# Patient Record
Sex: FEMALE | Race: WHITE
[De-identification: names, ages, dates, MRNs, and addresses within clinical notes are randomized per-mention and may not be internally consistent; named-entity substitution may affect disease eponyms.]

---

## 2017-08-17 NOTE — EDM.PDOC
ED HPI GENERAL MEDICAL PROBLEM





- General


Chief Complaint: Chest Pain


Stated Complaint: CHEST PRESSURE


Time Seen by Provider: 08/17/17 11:53


Source of Information: Reports: Patient, RN Notes Reviewed


History Limitations: Reports: No Limitations





- History of Present Illness


INITIAL COMMENTS - FREE TEXT/NARRATIVE: 





52-year-old female presents emergency department day complaint of chest 

pressure she states been having some palpitations and heartburn chest pressure 

with radiations into her left arm for the last 2 days she does not feel short 

of breath but it hurts when she takes a deep breath no nausea no diaphoresis 

she was able to go through her workout routine this morning without difficulty 

felt she had no change in her chest pressure did try Tums felt she had little 

relief from has noticed no change with food. Initially tried to go to the 

clinic however with the complaint of chest pain she was immediately referred to 

the emergency department for further evaluation


  ** Epigastric


Pain Score (Numeric/FACES): 5





- Related Data


 Allergies











Allergy/AdvReac Type Severity Reaction Status Date / Time


 


metronidazole [From Flagyl] Allergy Severe Airway Verified 08/17/17 11:39





   Tightness  











Home Meds: 


 Home Meds





Ergocalciferol (Vitamin D2) [Vitamin D2] 50,000 units PO WEEKLY 08/17/17 [

History]


Estradiol [Estradiol] 1 patch TOP WEEKLY 08/17/17 [History]


buPROPion HCl [Wellbutrin Xl] 150 mg PO DAILY 08/17/17 [History]











Past Medical History


HEENT History: Reports: Cataract, Impaired Vision


OB/GYN History: Reports: Pregnancy





- Past Surgical History


Female  Surgical History: Reports: Nephrectomy





Social & Family History





- Tobacco Use


Smoking Status *Q: Never Smoker





- Caffeine Use


Caffeine Use: Reports: Coffee





- Alcohol Use


Days Per Week of Alcohol Use: 1


Number of Drinks Per Day: 1


Total Drinks Per Week: 1





- Recreational Drug Use


Recreational Drug Use: No





ED ROS GENERAL





- Review of Systems


Review Of Systems: See Below


Constitutional: Denies: Diaphoresis


HEENT: Reports: No Symptoms


Respiratory: Reports: Shortness of Breath (Only with a deep breath)


Cardiovascular: Reports: Chest Pain


GI/Abdominal: Reports: No Symptoms


: Reports: No Symptoms





ED EXAM, GENERAL





- Physical Exam


Exam: See Below


Free Text/Narrative:: 





General: Female, anxious but not in any distress, alert and oriented x3 HEENT: 

head is atraumatic normocephalic, eyes pupils equal round reactive to light, 

sclera clear no conjunctivitis appreciated.  Ears blocked by cerumen 

bilaterally canals are clear.  Nose no septal deviation, nares are clear, no 

blood present.  Mouth mucosa is moist and pink no erythema or exudate noted in 

soft palate, tongue is midline uvula is midline, dentition is intact.


Neck: Supple no thyromegaly no tracheal deviation.


Nodes: Cervical nodes subclavicular nodes nontender no palpable lymphadenopathy 

noted.


Lungs: clear to auscultation bilaterally with symmetrical respirations, no 

adventitious noise appreciated.


CV: Regular rate and rhythm S1 and S2 appreciated no murmurs rubs or gallops 

noted.


Abdomen: Soft, nontender, no palpable masses or organomegaly appreciated, no 

distention no guarding bowel sounds are present, .


Neuro: Cranial nerves II through XII grossly intact


Skin: Warm and dry, intact


Extremities: No lower extremity edema appreciated, 





Course





- Vital Signs


Last Recorded V/S: 


 Last Vital Signs











Temp  98.4 F   08/17/17 11:35


 


Pulse  64   08/17/17 11:35


 


Resp  24 H  08/17/17 11:57


 


BP  133/85   08/17/17 11:57


 


Pulse Ox  93 L  08/17/17 11:57














- Orders/Labs/Meds


Orders: 


 Active Orders 24 hr











 Category Date Time Status


 


 Cardiac Monitoring [RC] .As Directed Care  08/17/17 12:00 Active


 


 EKG Documentation Completion [RC] ASDIRECTED Care  08/17/17 12:00 Active


 


 Chest 2V [CR] Stat Exams  08/17/17 12:00 Taken


 


 EKG 12 Lead [EK] Stat Ther  08/17/17 12:00 Ordered











Labs: 


 Laboratory Tests











  08/17/17 08/17/17 08/17/17 Range/Units





  12:06 12:06 12:06 


 


WBC  7.7    (4.5-11.0)  K/uL


 


RBC  4.58    (3.30-5.50)  M/uL


 


Hgb  14.3    (12.0-15.0)  g/dL


 


Hct  41.1    (36.0-48.0)  %


 


MCV  90    (80-98)  fL


 


MCH  31    (27-31)  pg


 


MCHC  35    (32-36)  %


 


Plt Count  363    (150-400)  K/uL


 


Neut % (Auto)  66    (36-66)  %


 


Lymph % (Auto)  22 L    (24-44)  %


 


Mono % (Auto)  9 H    (2-6)  %


 


Eos % (Auto)  2    (2-4)  %


 


Baso % (Auto)  1    (0-1)  %


 


D-Dimer, Quantitative   < 100   (0.0-400.0)  ng/mL


 


Sodium    142  (140-148)  mmol/L


 


Potassium    3.8  (3.6-5.2)  mmol/L


 


Chloride    105  (100-108)  mmol/L


 


Carbon Dioxide    30  (21-32)  mmol/L


 


Anion Gap    6.9  (5.0-14.0)  mmol/L


 


BUN    20 H  (7-18)  mg/dL


 


Creatinine    1.1 H  (0.6-1.0)  mg/dL


 


Est Cr Clr Drug Dosing    50.49  mL/min


 


Estimated GFR (MDRD)    52 L  (>60)  


 


Glucose    97  ()  mg/dL


 


Calcium    9.3  (8.5-10.1)  mg/dL


 


Total Bilirubin    0.4  (0.2-1.0)  mg/dL


 


AST    18  (15-37)  U/L


 


ALT    24  (12-78)  U/L


 


Alkaline Phosphatase    36 L  ()  U/L


 


Troponin I    < 0.017  (0.000-0.056)  ng/mL


 


Total Protein    7.0  (6.4-8.2)  g/dL


 


Albumin    3.4  (3.4-5.0)  g/dL


 


Globulin    3.6 H  (2.3-3.5)  g/dL


 


Albumin/Globulin Ratio    0.9 L  (1.2-2.2)  











Meds: 


Medications














Discontinued Medications














Generic Name Dose Route Start Last Admin





  Trade Name Freq  PRN Reason Stop Dose Admin


 


Al Hydroxide/Mg Hydroxide 15  0 ml  08/17/17 12:00  08/17/17 12:08





  ml/ Lidocaine HCl 15 ml  PO  08/17/17 12:01  15 ml





  ONETIME ONE   Administration














Departure





- Departure


Time of Disposition: 12:58


Disposition: Home, Self-Care 01


Condition: Good


Clinical Impression: 


 Atypical chest pain





Forms:  ED Department Discharge


Additional Instructions: 


Try over-the-counter antacid such as Zantac or Nexium,  Please followup with 

your primary care provider in 3-5 days if not better, please call return to the 

emergency department with worsening of symptoms.





- My Orders


Last 24 Hours: 


My Active Orders





08/17/17 12:00


Cardiac Monitoring [RC] .As Directed 


EKG Documentation Completion [RC] ASDIRECTED 


Chest 2V [CR] Stat 


EKG 12 Lead [EK] Stat 














- Assessment/Plan


Last 24 Hours: 


My Active Orders





08/17/17 12:00


Cardiac Monitoring [RC] .As Directed 


EKG Documentation Completion [RC] ASDIRECTED 


Chest 2V [CR] Stat 


EKG 12 Lead [EK] Stat 











Plan: 





Assessment





Acuity = acute





Site and laterality = chest pressure, located patient known history of anxiety





Etiology  = suspicious for underlying reflux disease





Manifestations = none





Location of injury =  Home





Lab values = CBC unremarkable, creatinine elevated 1.1 consistent with a 

chronic renal failure stage GIII a, troponin was negative, EKG demonstrates 

normal sinus rhythm no ST changes, chest x-ray I did review films myself I 

cannot appreciate any acute process, the official read from radiology is pending

, heart score is 1





Plan


I did review lab work EKG and chest x-ray results with her she was provided GI 

cocktail which provided symptomatic relief recommend she try over-the-counter 

antacid suppresses such as Nexium or Zantac, follow-up primary care in 3-5 days 

for reevaluation





























Patient was in agreement with the plan all questions were answered, they were 

instructed to return to the emergency department or call for worsening 

symptoms.  This note was dictated using dragon voice recognition software 

please call with any questions.

## 2017-08-17 NOTE — CR
Chest 2V

 

INDICATION:   Chest Pain 

 

COMPARISON:    None

 

FINDINGS:  Two views.   Heart size normal. Lungs are clear. No infiltrate or pleural effusion. No si
gns of pulmonary edema. Fat at the cardiophrenic angle on the right unchanged, normal variant.

 

IMPRESSION:    Negative chest.

## 2019-01-21 ENCOUNTER — HOSPITAL ENCOUNTER (EMERGENCY)
Dept: HOSPITAL 11 - JP.ED | Age: 54
Discharge: HOME | End: 2019-01-21
Payer: COMMERCIAL

## 2019-01-21 VITALS — DIASTOLIC BLOOD PRESSURE: 82 MMHG | SYSTOLIC BLOOD PRESSURE: 136 MMHG

## 2019-01-21 DIAGNOSIS — Z88.8: ICD-10-CM

## 2019-01-21 DIAGNOSIS — K59.01: Primary | ICD-10-CM

## 2019-01-21 DIAGNOSIS — Z79.899: ICD-10-CM

## 2019-01-21 PROCEDURE — 99283 EMERGENCY DEPT VISIT LOW MDM: CPT

## 2019-01-21 PROCEDURE — 85025 COMPLETE CBC W/AUTO DIFF WBC: CPT

## 2019-01-21 PROCEDURE — 96372 THER/PROPH/DIAG INJ SC/IM: CPT

## 2019-01-21 PROCEDURE — 80053 COMPREHEN METABOLIC PANEL: CPT

## 2019-01-21 PROCEDURE — 74019 RADEX ABDOMEN 2 VIEWS: CPT

## 2019-01-21 PROCEDURE — 81001 URINALYSIS AUTO W/SCOPE: CPT

## 2019-01-21 PROCEDURE — 36415 COLL VENOUS BLD VENIPUNCTURE: CPT

## 2019-01-21 NOTE — EDM.PDOC
ED HPI GENERAL MEDICAL PROBLEM





- General


Chief Complaint: Back Pain or Injury


Stated Complaint: BACK PAIN


Time Seen by Provider: 01/21/19 07:38


Source of Information: Reports: Patient


History Limitations: Reports: No Limitations





- History of Present Illness


INITIAL COMMENTS - FREE TEXT/NARRATIVE: 





This lady complains of left flank and left upper quadrant pain. It's been going 

on for about 3 or 4 days now. She was seen in the clinic on Friday and told 

that it was probably shingles but she is certain that it's not and she's never 

developed any kind of rash. The left upper quadrant pain does seem to be 

associated with movement. She has regular bowel movements. At one time she did 

think that maybe there was some blockage so she drank about half a bottle of 

magnesium citrate. That was yesterday. Cause some cramps and she had just a 

little bit of a loose bowel movement this morning. Denies any urinary symptoms. 

Denies any fever.


  ** Left Flank


Pain Score (Numeric/FACES): 3





- Related Data


 Allergies











Allergy/AdvReac Type Severity Reaction Status Date / Time


 


metronidazole [From Flagyl] Allergy Severe Airway Verified 08/17/17 11:39





   Tightness  











Home Meds: 


 Home Meds





Ergocalciferol (Vitamin D2) [Vitamin D2] 50,000 units PO WEEKLY 08/17/17 [

History]


Estradiol 1 patch TOP WEEKLY 08/17/17 [History]


buPROPion HCl [Wellbutrin Xl] 150 mg PO DAILY 08/17/17 [History]











Past Medical History





- Past Health History


Medical/Surgical History: Denies Medical/Surgical History


HEENT History: Reports: Cataract, Impaired Vision


OB/GYN History: Reports: Pregnancy





- Infectious Disease History


Infectious Disease History: Reports: Chicken Pox





- Past Surgical History


Female  Surgical History: Reports: Nephrectomy





Social & Family History





- Tobacco Use


Smoking Status *Q: Never Smoker





- Caffeine Use


Caffeine Use: Reports: Coffee





- Recreational Drug Use


Recreational Drug Use: No





ED ROS GENERAL





- Review of Systems


Review Of Systems: See Below


Constitutional: Reports: No Symptoms


HEENT: Reports: No Symptoms


Respiratory: Reports: No Symptoms


Cardiovascular: Reports: No Symptoms


Endocrine: Reports: No Symptoms


GI/Abdominal: Reports: Abdominal Pain.  Denies: Constipation, Diarrhea


: Reports: No Symptoms


Musculoskeletal: Reports: No Symptoms


Skin: Reports: No Symptoms


Neurological: Reports: No Symptoms





ED EXAM,LOWER BACK PAIN/INJURY





- Physical Exam


Exam: See Below


Exam Limited By: No Limitations


General Appearance: Alert, WD/WN, Mild Distress


Throat/Mouth: Normal Inspection


Respiratory/Chest: No Respiratory Distress, Lungs Clear


Cardiovascular: Regular Rate, Rhythm, No Murmur


GI/Abdominal: Normal Bowel Sounds, Soft, Tender (Left upper quadrant tenderness)


Back Exam: CVA Tenderness (L) (Mild left CVA tenderness)


Extremities: Normal Inspection


Neurological: Alert, Normal Mood/Affect


Skin Exam: Warm, Dry





Course





- Vital Signs


Last Recorded V/S: 





 Last Vital Signs











Temp  35.5 C   01/21/19 07:22


 


Pulse  75   01/21/19 07:22


 


Resp  14   01/21/19 07:22


 


BP  136/82   01/21/19 07:22


 


Pulse Ox  97   01/21/19 07:22














- Orders/Labs/Meds


Orders: 





 Active Orders 24 hr











 Category Date Time Status


 


 Abdomen 2V AP Flat Upright [CR] Stat Exams  01/21/19 07:49 Taken











Labs: 





 Laboratory Tests











  01/21/19 01/21/19 01/21/19 Range/Units





  08:01 08:09 08:09 


 


WBC   5.7   (4.5-11.0)  K/uL


 


RBC   4.58   (3.30-5.50)  M/uL


 


Hgb   13.9   (12.0-15.0)  g/dL


 


Hct   41.1   (36.0-48.0)  %


 


MCV   90   (80-98)  fL


 


MCH   30   (27-31)  pg


 


MCHC   34   (32-36)  %


 


Plt Count   384   (150-400)  K/uL


 


Neut % (Auto)   58   (36-66)  %


 


Lymph % (Auto)   27   (24-44)  %


 


Mono % (Auto)   10 H   (2-6)  %


 


Eos % (Auto)   4   (2-4)  %


 


Baso % (Auto)   1   (0-1)  %


 


Sodium    140  (140-148)  mmol/L


 


Potassium    4.2  (3.6-5.2)  mmol/L


 


Chloride    105  (100-108)  mmol/L


 


Carbon Dioxide    29  (21-32)  mmol/L


 


Anion Gap    5.8  (5.0-14.0)  mmol/L


 


BUN    19 H  (7-18)  mg/dL


 


Creatinine    0.9  (0.6-1.0)  mg/dL


 


Est Cr Clr Drug Dosing    60.99  mL/min


 


Estimated GFR (MDRD)    > 60  (>60)  


 


Glucose    107 H  ()  mg/dL


 


Calcium    9.4  (8.5-10.1)  mg/dL


 


Total Bilirubin    0.4  (0.2-1.0)  mg/dL


 


AST    17  (15-37)  U/L


 


ALT    28  (12-78)  U/L


 


Alkaline Phosphatase    45 L  ()  U/L


 


Total Protein    6.9  (6.4-8.2)  g/dL


 


Albumin    3.3 L  (3.4-5.0)  g/dL


 


Globulin    3.6 H  (2.3-3.5)  g/dL


 


Albumin/Globulin Ratio    0.9 L  (1.2-2.2)  


 


Urine Color  Yellow    


 


Urine Appearance  Clear    


 


Urine pH  6.0    (4.5-8.0)  


 


Ur Specific Gravity  1.015    (1.008-1.030)  


 


Urine Protein  Trace    (NEGATIVE)  mg/dL


 


Urine Glucose (UA)  Normal    (NEGATIVE)  mg/dL


 


Urine Ketones  Negative    (NEGATIVE)  mg/dL


 


Urine Occult Blood  Trace    (NEGATIVE)  


 


Urine Nitrite  Negative    (NEGATIVE)  


 


Urine Bilirubin  Negative    (NEGATIVE)  


 


Urine Urobilinogen  Normal    (NORMAL)  mg/dL


 


Ur Leukocyte Esterase  Small    (NEGATIVE)  


 


Urine RBC  5-10 H    (0-5)  


 


Urine WBC  5-10 H    (0-5)  


 


Ur Epithelial Cells  Few    


 


Amorphous Sediment  Few    


 


Urine Bacteria  Not seen    


 


Urine Mucus  Not seen    











Meds: 





Medications














Discontinued Medications














Generic Name Dose Route Start Last Admin





  Trade Name Freq  PRN Reason Stop Dose Admin


 


Hydromorphone HCl  1 mg  01/21/19 09:31  01/21/19 09:37





  Dilaudid  IM  01/21/19 09:32  1 mg





  ONETIME ONE   Administration





     





     





     





     














- Radiology Interpretation


Free Text/Narrative:: 





KUB shows stool throughout most of the colon but seems to end at the splenic 

flexure. Appears the descending colon is has been evacuated although that side 

of the x-rays not seen very well.





- Re-Assessments/Exams


Free Text/Narrative Re-Assessment/Exam: 





01/21/19 09:57


Discussed the findings with the patient. Will medicate her for pain I'm giving 

her right now a milligram of Dilaudid IM. Then I recommend the mag citrate full 

bottle and several glasses of water. She did have just a small amount of WBCs 

and RBCs in the urine but based on my exam and x-ray findings I think the 

proper thing to do would be to clear out her bowels. If that doesn't solve the 

problem then a CT KUB could be done but I don't think that that would be 

warranted right at the moment.








Departure





- Departure


Time of Disposition: 10:01


Disposition: Home, Self-Care 01


Condition: Fair


Clinical Impression: 


 Abdominal pain, Constipation by delayed colonic transit








- Discharge Information


Referrals: 


PCP,None [Primary Care Provider] - 


Additional Instructions: 


Drink one full bottle of magnesium citrate followed by four 8 ounce glasses of 

water. This will generally clear out your bowels within 24 hours. You may have 

some cramping if so use the Percocet.


There was a tiny bit of blood both red cells and white cells in the urine. That 

is frequently seen in normal people but it's always a little bit worrisome. If 

the mag citrate clears out your bowels and the pain persists then you would 

want to have a CT scan done to look for a kidney stone. That wouldn't be 

appropriate right now until your bowels or cleared out.





Note that if you did have a kidney stone the medication Percocet would also be 

appropriate.





At any rate when this is all over be sure that your Dr. recheck your urine and 

make sure that the small amount of blood has cleared up.  Blood that persists 

in the urine could be a sign of something more serious.





Note that Percocet can cause sedation and impair driving or operating machinery 

and if abused can actually be addicting.





- My Orders


Last 24 Hours: 





My Active Orders





01/21/19 07:49


Abdomen 2V AP Flat Upright [CR] Stat 














- Assessment/Plan


Last 24 Hours: 





My Active Orders





01/21/19 07:49


Abdomen 2V AP Flat Upright [CR] Stat

## 2019-01-22 ENCOUNTER — HOSPITAL ENCOUNTER (EMERGENCY)
Dept: HOSPITAL 11 - JP.ED | Age: 54
Discharge: HOME | End: 2019-01-22
Payer: COMMERCIAL

## 2019-01-22 VITALS — DIASTOLIC BLOOD PRESSURE: 92 MMHG | SYSTOLIC BLOOD PRESSURE: 163 MMHG

## 2019-01-22 DIAGNOSIS — R10.9: Primary | ICD-10-CM

## 2019-01-22 DIAGNOSIS — Z88.8: ICD-10-CM

## 2019-01-22 PROCEDURE — 96374 THER/PROPH/DIAG INJ IV PUSH: CPT

## 2019-01-22 PROCEDURE — 99285 EMERGENCY DEPT VISIT HI MDM: CPT

## 2019-01-22 PROCEDURE — 74176 CT ABD & PELVIS W/O CONTRAST: CPT

## 2019-01-22 PROCEDURE — 96361 HYDRATE IV INFUSION ADD-ON: CPT

## 2019-01-22 PROCEDURE — 74177 CT ABD & PELVIS W/CONTRAST: CPT

## 2019-01-22 NOTE — CT
Abdomen Pelvis w Cont

 

CLINICAL HISTORY: Left flank pain 

 

COMPARISON: 2013.

 

TECHNIQUE: Axial tomographic images are obtained from the dome of the diaphragm

to the pubic symphysis without IV contrast enhancement. No oral contrast was

used. Auto dosage reduction and iterative reconstruction techniques employed.

 

FINDINGS: The lung bases are clear. The liver contains multiple granulomata.

There is no mass or biliary dilatation. The gallbladder has a normal contour.

The spleen contains scattered granulomata. The pancreas shows no mass or

inflammatory change. The adrenal glands appear normal bilaterally. The right

kidney has been removed. The left kidney contains a bilobed the 2 x 3 cm cyst in

the in the midpole. There is an ovoid  parapelvic cyst measuring 2 x 3 cm. This

has increased in size slightly since 2013. No stones are identified. The ureter

has a normal course and contour. There are multiple calcifications in the pelvis

just above the bladder. Delayed postcontrast imaging shows a small amount of

contrast through a nondilated distal ureter. Intestinal pattern is nonacute. The

aorta has a normal contour the bladder is thick-walled.  

 

 

IMPRESSION: Previous right nephrectomy

 

Left renal parenchymal and parapelvic cysts

 

No urinary stones or dilatation on the left

 

Previous granulomatous exposure

 

Generalized bladder wall thickening

## 2019-01-22 NOTE — EDM.PDOC
ED HPI GENERAL MEDICAL PROBLEM





- General


Chief Complaint: Gastrointestinal Problem


Stated Complaint: BOWEL PROBLEMS


Time Seen by Provider: 01/22/19 08:50


Source of Information: Reports: Patient, Family


History Limitations: Reports: No Limitations





- History of Present Illness


INITIAL COMMENTS - FREE TEXT/NARRATIVE: 





53-year-old female with persistent left-sided abdominal and flank pain for the 

past 6 days. No fevers or chills. She said several physician visits have 

resulted in several different diagnoses including shingles and constipation. 

Her labs have been reassuring. She has taken a preparation of MiraLAX and is 

not improving. She has difficulty laying on her left side, she took Percocet 

last night which helped her sleep but the pain does not resolve. No urinary 

symptoms.


Onset: Gradual


Duration: Day(s): (Left abdominal and flank pain has been present for 6 days)


Location: Reports: Abdomen, Back


Severity: Moderate


Improves with: Reports: Medication (Pain medication helps)


Worsens with: Reports: Movement (Movement and lying on her left side both seem 

to make pain worse)


Associated Symptoms: Reports: Chest Pain (She had chest pain 2 weeks ago which 

was worked up in [place], everything was negative and a stress test is planned)

, Nausea/Vomiting (The colon prep made her nauseous with some vomiting).  Denies

: Cough, Fever/Chills, Shortness of Breath


  ** Left Lower Abdomen


Pain Score (Numeric/FACES): 10





- Related Data


 Allergies











Allergy/AdvReac Type Severity Reaction Status Date / Time


 


metronidazole [From Flagyl] Allergy Severe Airway Verified 01/22/19 08:23





   Tightness  











Home Meds: 


 Home Meds





NK [No Known Home Meds]  01/22/19 [History]











Past Medical History





- Past Health History


Medical/Surgical History: Denies Medical/Surgical History


HEENT History: Reports: Cataract, Impaired Vision


Gastrointestinal History: Reports: Chronic Constipation


OB/GYN History: Reports: Pregnancy





- Infectious Disease History


Infectious Disease History: Reports: Chicken Pox





- Past Surgical History


HEENT Surgical History: Reports: None


GI Surgical History: Reports: None


Female  Surgical History: Reports: Nephrectomy


Dermatological Surgical History: Reports: None





Social & Family History





- Tobacco Use


Smoking Status *Q: Never Smoker


Second Hand Smoke Exposure: No





- Caffeine Use


Caffeine Use: Reports: Coffee





ED ROS GENERAL





- Review of Systems


Review Of Systems: See Below


Constitutional: Reports: Malaise.  Denies: Fever, Chills


HEENT: Reports: No Symptoms


Respiratory: Denies: Shortness of Breath, Cough


Cardiovascular: Reports: Chest Pain (Chest pain is resolved)


GI/Abdominal: Reports: Abdominal Pain, Constipation, Nausea, Vomiting


: Reports: No Symptoms


Skin: Reports: No Symptoms


Neurological: Reports: No Symptoms





ED EXAM, GI/ABD





- Physical Exam


Exam: See Below


Exam Limited By: No Limitations


General Appearance: Alert, No Apparent Distress (Looks uncomfortable but not 

distressed)


Eyes: Bilateral: Normal Appearance (No jaundice)


Head: Atraumatic


Respiratory/Chest: No Respiratory Distress


GI/Abdominal Exam: Soft, Tender (Patient does have tenderness to palpation 

along the left anterior abdomen but no rebound tenderness or significant 

guarding)


Extremities: Normal Inspection


Neurological: Alert, Oriented





Course





- Vital Signs


Last Recorded V/S: 


 Last Vital Signs











Temp  95.6 F   01/22/19 08:26


 


Pulse  86   01/22/19 08:26


 


Resp  16   01/22/19 08:26


 


BP  163/92 H  01/22/19 08:26


 


Pulse Ox  99   01/22/19 08:26














- Orders/Labs/Meds


Meds: 


Medications














Discontinued Medications














Generic Name Dose Route Start Last Admin





  Trade Name Freq  PRN Reason Stop Dose Admin


 


Sodium Chloride  1,000 mls @ 1,000 mls/hr  01/22/19 09:00  01/22/19 09:06





  Normal Saline  IV   1,000 mls/hr





  ASDIRECTED NICK   Administration





     





     





     





     


 


Sodium Chloride  77 mls @ 3.5 mls/sec  01/22/19 09:01  01/22/19 09:33





  Normal Saline  IV  01/22/19 09:02  3.5 mls/sec





  ONETIME ONE   Administration





     





     





     





     


 


Iopamidol  123 ml  01/22/19 09:15  01/22/19 09:33





  Isovue-300 (61%)  IV   105 ml





  .AS DIRECTED NICK   Administration





     





     





     





     


 


Ketorolac Tromethamine  15 mg  01/22/19 10:15  01/22/19 10:19





  Toradol  IVPUSH  01/22/19 10:16  15 mg





  ONETIME ONE   Administration





     





     





     





     


 


Sodium Chloride  10 ml  01/22/19 09:01  01/22/19 09:05





  Saline Flush  FLUSH  01/22/19 09:02  10 ml





  ONETIME ONE   Administration





     





     





     





     














- Re-Assessments/Exams


Free Text/Narrative Re-Assessment/Exam: 





01/22/19 09:15


Reviewed the notes from the last several visits, everything is been evaluated 

other than a CT scan of the abdomen and pelvis. She does only have one kidney 

but her GFR is greater than 60, creatinine less than 1 so she will be bolused 

with 1 L of normal saline followed by IV contrast enhanced abdomen and pelvis 

CT scan.


01/22/19 14:00


CT scan was negative, radiologist recommended a second CT to follow the 

contrast to make sure the ureter was clear. This was also read as negative. 

After 15 mg of IV Toradol the patient felt quite a bit better, will stop the 

Percocet and use one Aleve twice daily and increase activity as tolerated. She'

ll also stop any bowel preps and treatment for constipation and return to her 

regular diet concentrating on staying hydrated.





Departure





- Departure


Time of Disposition: 11:10


Disposition: Home, Self-Care 01


Condition: Fair


Clinical Impression: 


 Left flank pain








- Discharge Information


Instructions:  Flank Pain, Adult, Easy-to-Read


Referrals: 


Alyx Kaufman NP [Primary Care Provider] - 


Forms:  ED Department Discharge


Care Plan Goals: 


Try one Aleve twice daily, continue with Tylenol as needed and use Zofran under 

your tongue for any persistent nausea. Resume your regular diet, stay hydrated, 

and increase activity as tolerated including returning to work. Consider 

rechecking in 2-3 days if not improving satisfactorily or return sooner if 

worsening such as fever or breathing difficulties.

## 2022-01-06 ENCOUNTER — HOSPITAL ENCOUNTER (EMERGENCY)
Dept: HOSPITAL 11 - JP.ED | Age: 57
Discharge: HOME | End: 2022-01-06
Payer: COMMERCIAL

## 2022-01-06 VITALS — SYSTOLIC BLOOD PRESSURE: 159 MMHG | HEART RATE: 59 BPM | DIASTOLIC BLOOD PRESSURE: 86 MMHG

## 2022-01-06 DIAGNOSIS — Z87.891: ICD-10-CM

## 2022-01-06 DIAGNOSIS — Z88.1: ICD-10-CM

## 2022-01-06 DIAGNOSIS — Z20.822: ICD-10-CM

## 2022-01-06 DIAGNOSIS — R07.89: Primary | ICD-10-CM

## 2022-01-06 LAB — SARS-COV-2 RNA RESP QL NAA+PROBE: NEGATIVE

## 2022-01-06 PROCEDURE — 0241U: CPT

## 2022-01-06 PROCEDURE — 85025 COMPLETE CBC W/AUTO DIFF WBC: CPT

## 2022-01-06 PROCEDURE — 36415 COLL VENOUS BLD VENIPUNCTURE: CPT

## 2022-01-06 PROCEDURE — 93005 ELECTROCARDIOGRAM TRACING: CPT

## 2022-01-06 PROCEDURE — 99285 EMERGENCY DEPT VISIT HI MDM: CPT

## 2022-01-06 PROCEDURE — 80053 COMPREHEN METABOLIC PANEL: CPT

## 2022-01-06 PROCEDURE — 84484 ASSAY OF TROPONIN QUANT: CPT

## 2022-01-06 NOTE — EDM.PDOC
ED HPI GENERAL MEDICAL PROBLEM





- General


Chief Complaint: Chest Pain


Stated Complaint: CHEST PAIN


Time Seen by Provider: 01/06/22 10:45


Source of Information: Reports: Patient


History Limitations: Reports: No Limitations





- History of Present Illness


INITIAL COMMENTS - FREE TEXT/NARRATIVE: 





56-year-old female with no cardiac history presents with 2 days of intermittent 

sharp chest pains.  She usually exercises regularly and has no problem with 

pain, she is also lost 30 pounds over the past several months.  Over the past 2 

weeks however she had a significant cold, and a "sinus infection" so she has not

been exercising.  Yesterday she was going to exercise but then developed the 

subtle sharp brief chest pains and decided to take a nap instead and today while

she was at work she developed a sharp pain in her left shoulder that lasted a 

few seconds and it scared her so she came in to be seen.  She has no pain now.  

No diaphoresis currently, no nausea or vomiting.  She has not been vaccinated 

for Covid.


Onset: Sudden


Duration: Other (When she gets pain it is a sharp pain and lasts only a few 

seconds)


Location: Reports: Chest (Posterior and anterior chest, usually on the left side

either the axillary area or below her scapula)


Quality: Reports: Stabbing


Improves with: Reports: None


Worsens with: Reports: Breathing (Hurts to breathe briefly)


Associated Symptoms: Reports: No Other Symptoms


  ** Chest


Pain Score (Numeric/FACES): 6





- Related Data


                                    Allergies











Allergy/AdvReac Type Severity Reaction Status Date / Time


 


metronidazole [From Flagyl] Allergy Severe Airway Verified 01/06/22 10:41





   Tightness  











Home Meds: 


                                    Home Meds





Calcium Carbonate [Calcium] 500 mg PO DAILY 01/06/22 [History]


Cholecalciferol (Vitamin D3) [Vitamin D] 5,000 unit PO DAILY 01/06/22 [History]











Past Medical History





- Past Health History


Medical/Surgical History: Denies Medical/Surgical History


HEENT History: Reports: Cataract, Impaired Vision


Gastrointestinal History: Reports: Chronic Constipation


OB/GYN History: Reports: Pregnancy





- Infectious Disease History


Infectious Disease History: Reports: Chicken Pox





- Past Surgical History


HEENT Surgical History: Reports: None


GI Surgical History: Reports: None


Female  Surgical History: Reports: Nephrectomy


Dermatological Surgical History: Reports: None





Social & Family History





- Tobacco Use


Tobacco Use Status *Q: Former Tobacco User


Used Tobacco, but Quit: Yes


Month/Year Tobacco Last Used: 01/1995





- Caffeine Use


Caffeine Use: Reports: Coffee





- Recreational Drug Use


Recreational Drug Use: No





ED ROS GENERAL





- Review of Systems


Review Of Systems: See Below


Constitutional: Denies: Fever, Chills


HEENT: Denies: Vision Change


Respiratory: Reports: Pleuritic Chest Pain.  Denies: Shortness of Breath


Cardiovascular: Reports: Chest Pain


GI/Abdominal: Reports: No Symptoms


: Reports: No Symptoms


Musculoskeletal: Reports: No Symptoms


Skin: Reports: No Symptoms


Neurological: Denies: Headache


Psychiatric: Reports: No Symptoms





ED EXAM, GENERAL





- Physical Exam


Exam: See Below


Exam Limited By: No Limitations


General Appearance: Alert, No Apparent Distress


Eye Exam: Bilateral Eye: Normal Inspection


Head: Atraumatic


Neck: Supple, Non-Tender


Respiratory/Chest: No Respiratory Distress, Lungs Clear, Other (I cannot 

reproduce chest pain with palpation of the chest wall)


Cardiovascular: Regular Rate, Rhythm


GI/Abdominal: Soft, Non-Tender


Extremities: Normal Inspection.  No: Pedal Edema


Neurological: Alert, Oriented


  ** #1 Interpretation


EKG Date: 01/06/22


Time: 10:50


Rhythm: NSR


EKG Interpretation Comments: 





EKG done on arrival to ER is completely normal





Course





- Vital Signs


Last Recorded V/S: 


                                Last Vital Signs











Temp  97.8 F   01/06/22 10:53


 


Pulse  59 L  01/06/22 10:40


 


Resp  16   01/06/22 10:40


 


BP  159/86 H  01/06/22 10:40


 


Pulse Ox  99   01/06/22 10:40














- Orders/Labs/Meds


Orders: 


                               Active Orders 24 hr











 Category Date Time Status


 


 Isolation [COMM] Stat Oth  01/06/22 11:00 Ordered


 


 EKG 12 Lead [EK] Routine Ther  01/06/22 10:59 Ordered











Labs: 


                                Laboratory Tests











  01/06/22 01/06/22 01/06/22 Range/Units





  10:59 10:59 11:09 


 


WBC  6.1    (4.5-11.0)  K/uL


 


RBC  4.58    (3.30-5.50)  M/uL


 


Hgb  14.3    (12.0-15.0)  g/dL


 


Hct  41.4    (36.0-48.0)  %


 


MCV  90    (80-98)  fL


 


MCH  31    (27-31)  pg


 


MCHC  35    (32-36)  %


 


Plt Count  371    (150-400)  K/uL


 


Neut % (Auto)  57.5    (36-66)  %


 


Lymph % (Auto)  29.5    (24-44)  %


 


Mono % (Auto)  9.4 H    (2-6)  %


 


Eos % (Auto)  2.4    (2-4)  %


 


Baso % (Auto)  0.7    (0-1)  %


 


Sodium   139 L   (140-148)  mmol/L


 


Potassium   3.9   (3.6-5.2)  mmol/L


 


Chloride   102   (100-108)  mmol/L


 


Carbon Dioxide   31   (21-32)  mmol/L


 


Anion Gap   9.9   (5.0-14.0)  mmol/L


 


BUN   32 H D   (7-18)  mg/dL


 


Creatinine   0.9   (0.6-1.0)  mg/dL


 


Est Cr Clr Drug Dosing   58.84   mL/min


 


Estimated GFR (MDRD)   > 60   (>60)  


 


Glucose   104   ()  mg/dL


 


Calcium   9.3   (8.5-10.1)  mg/dL


 


Total Bilirubin   0.4   (0.2-1.0)  mg/dL


 


AST   26   (15-37)  U/L


 


ALT   50  D   (12-78)  U/L


 


Alkaline Phosphatase   41 L   ()  U/L


 


Troponin I High Sens   6.7   (<=60.3)  pg/mL


 


Total Protein   6.8   (6.4-8.2)  g/dL


 


Albumin   3.7   (3.4-5.0)  g/dL


 


Globulin   3.1   (2.3-3.5)  g/dL


 


Albumin/Globulin Ratio   1.2   (1.2-2.2)  


 


Influenza Type A RNA    Negative  (NEGATIVE)  


 


RSV RNA (INAAT)    Negative  (NEGATIVE)  


 


Influenza Type B RNA    Negative  (NEGATIVE)  


 


SARS-CoV-2 RNA (DANIELLE)    Negative  (NEGATIVE)  














- Re-Assessments/Exams


Free Text/Narrative Re-Assessment/Exam: 





01/06/22 11:06


EKG is normal, this pain does not represent typical cardiac pain.  It acts more 

like a an intercostal muscle spasm or pleuritic pain.  A Covid 4 Plex study was 

obtained as well as CBC CMP and troponin.  Patient was monitored while awaiting 

labs.


01/06/22 11:58


All labs were reassuring, she did develop a short episode of anterior left chest

 pain while here, it did get worse with palpitation so this is likely 

intermittent intercostal muscle irritation. Labs returned normal, troponin was 

negative, virus studies were negative. Patient is can increase activity as 

tolerated.





Departure





- Departure


Time of Disposition: 12:10


Disposition: Home, Self-Care 01


Clinical Impression: 


 Atypical chest pain, Chest wall pain








- Discharge Information


Instructions:  Nonspecific Chest Pain, Adult, Easy-to-Read


Referrals: 


PCP,None [Primary Care Provider] - 


Forms:  ED Department Discharge


Care Plan Goals: 


Increase activity as tolerated, take some longer acting anti-inflammatories like

Aleve and use a local heating pad if symptoms persist. Stay hydrated and return 

anytime if pain becomes more persistent or you develop other concerns.





Sepsis Event Note (ED)





- Evaluation


Sepsis Screening Result: No Definite Risk





- Focused Exam


Vital Signs: 


                                   Vital Signs











  Temp Pulse Resp BP Pulse Ox


 


 01/06/22 10:53  97.8 F    


 


 01/06/22 10:40   59 L  16  159/86 H  99














- My Orders


Last 24 Hours: 


My Active Orders





01/06/22 10:59


EKG 12 Lead [EK] Routine 





01/06/22 11:00


Isolation [COMM] Stat 














- Assessment/Plan


Last 24 Hours: 


My Active Orders





01/06/22 10:59


EKG 12 Lead [EK] Routine 





01/06/22 11:00


Isolation [COMM] Stat

## 2023-07-11 ENCOUNTER — HOSPITAL ENCOUNTER (EMERGENCY)
Dept: HOSPITAL 11 - JP.ED | Age: 58
Discharge: HOME | End: 2023-07-11
Payer: COMMERCIAL

## 2023-07-11 VITALS — DIASTOLIC BLOOD PRESSURE: 85 MMHG | SYSTOLIC BLOOD PRESSURE: 167 MMHG | HEART RATE: 71 BPM

## 2023-07-11 DIAGNOSIS — Z86.16: ICD-10-CM

## 2023-07-11 DIAGNOSIS — S91.332A: Primary | ICD-10-CM

## 2023-07-11 DIAGNOSIS — L03.116: ICD-10-CM

## 2023-07-11 DIAGNOSIS — Z79.899: ICD-10-CM
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
## 2023-07-11 DIAGNOSIS — Z88.8: ICD-10-CM

## 2023-07-11 PROCEDURE — 90471 IMMUNIZATION ADMIN: CPT

## 2023-07-11 PROCEDURE — 96372 THER/PROPH/DIAG INJ SC/IM: CPT

## 2023-07-11 PROCEDURE — 90715 TDAP VACCINE 7 YRS/> IM: CPT

## 2023-07-11 PROCEDURE — 99283 EMERGENCY DEPT VISIT LOW MDM: CPT

## 2023-07-11 RX ADMIN — TETANUS TOXOID, REDUCED DIPHTHERIA TOXOID AND ACELLULAR PERTUSSIS VACCINE, ADSORBED ONE ML: 5; 2.5; 8; 8; 2.5 SUSPENSION INTRAMUSCULAR at 23:08

## 2023-07-11 RX ADMIN — SODIUM CHLORIDE ONE INJ: 9 INJECTION, SOLUTION INTRAVENOUS at 23:01
